# Patient Record
Sex: FEMALE | Race: WHITE | Employment: FULL TIME | URBAN - METROPOLITAN AREA
[De-identification: names, ages, dates, MRNs, and addresses within clinical notes are randomized per-mention and may not be internally consistent; named-entity substitution may affect disease eponyms.]

---

## 2017-04-19 ENCOUNTER — TELEPHONE (OUTPATIENT)
Dept: FAMILY MEDICINE CLINIC | Facility: CLINIC | Age: 22
End: 2017-04-19

## 2017-04-19 ENCOUNTER — OFFICE VISIT (OUTPATIENT)
Dept: FAMILY MEDICINE CLINIC | Facility: CLINIC | Age: 22
End: 2017-04-19

## 2017-04-19 VITALS
SYSTOLIC BLOOD PRESSURE: 102 MMHG | TEMPERATURE: 99 F | HEART RATE: 87 BPM | WEIGHT: 175 LBS | RESPIRATION RATE: 14 BRPM | DIASTOLIC BLOOD PRESSURE: 64 MMHG | HEIGHT: 64 IN | OXYGEN SATURATION: 98 % | BODY MASS INDEX: 29.88 KG/M2

## 2017-04-19 DIAGNOSIS — M54.2 NECK PAIN ON LEFT SIDE: Primary | ICD-10-CM

## 2017-04-19 PROCEDURE — 99213 OFFICE O/P EST LOW 20 MIN: CPT | Performed by: PHYSICIAN ASSISTANT

## 2017-04-19 NOTE — PROGRESS NOTES
CHIEF COMPLAINT:     Patient presents with:  Jaw Pain: since yesterday, worsening, aching     HPI:   Doroteo Nunez is a 23year old female who is here for complaints of neck pain/pain to submandibular area.     Symptoms for: 1 day, started yesterday  L pain    PLAN:   Comfort care as listed in patient instructions as well as rest, heat, NSAIDS   Ibuprofen 600 mg ever 6-8 hours as needed for pain.    To f/u with PCP if no improvement in 3-5 or sooner if sx worsen; discussed S&S that require immediate medic

## 2017-04-19 NOTE — PATIENT INSTRUCTIONS
Take ibuprofen 600 mg every 8 hours with food  Warm compress to affected side of neck/jaw  If symptoms worsen in the next 24-48 hours or you develop neck stiffness or signficant swelling to lymph nodes in that area must go to ER or Immediate Care  Follow u

## 2017-07-03 ENCOUNTER — HOSPITAL ENCOUNTER (OUTPATIENT)
Age: 22
Discharge: HOME | End: 2017-07-03
Attending: FAMILY MEDICINE
Payer: COMMERCIAL

## 2017-07-03 ENCOUNTER — LAB ENCOUNTER (OUTPATIENT)
Dept: LAB | Age: 22
End: 2017-07-03
Attending: FAMILY MEDICINE
Payer: COMMERCIAL

## 2017-07-03 VITALS
OXYGEN SATURATION: 98 % | WEIGHT: 175 LBS | SYSTOLIC BLOOD PRESSURE: 110 MMHG | DIASTOLIC BLOOD PRESSURE: 62 MMHG | TEMPERATURE: 98 F | RESPIRATION RATE: 16 BRPM | BODY MASS INDEX: 29.88 KG/M2 | HEART RATE: 70 BPM | HEIGHT: 64 IN

## 2017-07-03 DIAGNOSIS — A09 TRAVELER'S DIARRHEA: Primary | ICD-10-CM

## 2017-07-03 DIAGNOSIS — A09 TRAVELER'S DIARRHEA: ICD-10-CM

## 2017-07-03 PROCEDURE — 99204 OFFICE O/P NEW MOD 45 MIN: CPT

## 2017-07-03 PROCEDURE — 87045 FECES CULTURE AEROBIC BACT: CPT

## 2017-07-03 PROCEDURE — 87177 OVA AND PARASITES SMEARS: CPT

## 2017-07-03 PROCEDURE — 87209 SMEAR COMPLEX STAIN: CPT

## 2017-07-03 PROCEDURE — 87427 SHIGA-LIKE TOXIN AG IA: CPT

## 2017-07-03 PROCEDURE — 87269 GIARDIA AG IF: CPT

## 2017-07-03 PROCEDURE — 99203 OFFICE O/P NEW LOW 30 MIN: CPT

## 2017-07-03 PROCEDURE — 89055 LEUKOCYTE ASSESSMENT FECAL: CPT

## 2017-07-03 PROCEDURE — 87046 STOOL CULTR AEROBIC BACT EA: CPT

## 2017-07-03 RX ORDER — AZITHROMYCIN 500 MG/1
500 TABLET, FILM COATED ORAL DAILY
Qty: 3 TABLET | Refills: 0 | Status: SHIPPED | OUTPATIENT
Start: 2017-07-03 | End: 2017-07-06

## 2017-07-03 NOTE — ED INITIAL ASSESSMENT (HPI)
Patient c/o diarrhea for 3 weeks. States she is having around 20 bouts/day since getting home 6 days ago. Patient was in [address] for 2 weeks and returned 6 days ago. No fever, chills, n/v, or abd pain.

## 2017-07-03 NOTE — ED PROVIDER NOTES
Patient Seen in: [id number] [facility] [place] [facility]    History   Patient presents with:  Diarrhea    Stated Complaint: diarrhea/was in [place]    HPI    27-year-old female presents to the clinic today with chief complaints of diarrhea for the past 3 weeks. except as noted above. PSFH elements reviewed from today and agreed except as otherwise stated in HPI.     Physical Exam   ED Triage Vitals [07/03/17 1031]  BP: 110/62  Pulse: 70  Resp: 16  Temp: 98 °F (36.7 °C)  Temp src: Temporal  SpO2: 98 %  O2 Device vomiting,worsening diarrhea, black stools, blood in his stools, bloody vomit dizziness, lightheadedness, fatigue, extreme tiredness, loss of consciousness or feeling of loss of consciousness, fever, chills.          Disposition and Plan     Clinical Impress

## 2017-07-07 LAB
OVA AND PARASITE, TRICHROME STAIN: NEGATIVE
OVA AND PARASITE, WET MOUNT: NEGATIVE

## 2017-11-03 NOTE — TELEPHONE ENCOUNTER
Tried to call patient to advise that Dr Raina Lyn would take her as a patient, VM was full not able to leave message.
Yes i will, thanks
Calm

## 2019-01-18 ENCOUNTER — TELEPHONE (OUTPATIENT)
Dept: FAMILY MEDICINE CLINIC | Facility: CLINIC | Age: 24
End: 2019-01-18

## 2019-01-18 NOTE — TELEPHONE ENCOUNTER
Pt called, said her mom talked to Dr. Carlos Sorensen and Dr. Carlos Sorensen told her mom that she would take pt on as a new pt. Please verify and call pt at [phone] to set up NP appt for migraine consultation.

## 2019-01-18 NOTE — TELEPHONE ENCOUNTER
Looks like Dr. Judy Peters agreed to see this patient on 4/19/17     I confimed and Dr. Jduy Peters will see this pt

## 2019-05-28 ENCOUNTER — OFFICE VISIT (OUTPATIENT)
Dept: FAMILY MEDICINE CLINIC | Facility: CLINIC | Age: 24
End: 2019-05-28
Payer: COMMERCIAL

## 2019-05-28 VITALS
WEIGHT: 184.63 LBS | OXYGEN SATURATION: 99 % | RESPIRATION RATE: 14 BRPM | HEIGHT: 63.5 IN | BODY MASS INDEX: 32.31 KG/M2 | HEART RATE: 76 BPM | SYSTOLIC BLOOD PRESSURE: 126 MMHG | DIASTOLIC BLOOD PRESSURE: 80 MMHG | TEMPERATURE: 98 F

## 2019-05-28 DIAGNOSIS — R21 SKIN RASH: Primary | ICD-10-CM

## 2019-05-28 DIAGNOSIS — Q83.9 NIPPLE ANOMALY: ICD-10-CM

## 2019-05-28 DIAGNOSIS — L29.9 ITCHING: ICD-10-CM

## 2019-05-28 DIAGNOSIS — J30.89 SEASONAL ALLERGIC RHINITIS DUE TO OTHER ALLERGIC TRIGGER: ICD-10-CM

## 2019-05-28 DIAGNOSIS — G43.709 CHRONIC MIGRAINE WITHOUT AURA WITHOUT STATUS MIGRAINOSUS, NOT INTRACTABLE: ICD-10-CM

## 2019-05-28 DIAGNOSIS — F41.9 ANXIETY: ICD-10-CM

## 2019-05-28 PROCEDURE — 36415 COLL VENOUS BLD VENIPUNCTURE: CPT | Performed by: FAMILY MEDICINE

## 2019-05-28 PROCEDURE — 86003 ALLG SPEC IGE CRUDE XTRC EA: CPT | Performed by: FAMILY MEDICINE

## 2019-05-28 PROCEDURE — 82785 ASSAY OF IGE: CPT | Performed by: FAMILY MEDICINE

## 2019-05-28 PROCEDURE — 99204 OFFICE O/P NEW MOD 45 MIN: CPT | Performed by: FAMILY MEDICINE

## 2019-05-28 RX ORDER — SUMATRIPTAN 25 MG/1
TABLET, FILM COATED ORAL EVERY 2 HOUR PRN
Qty: 9 TABLET | Refills: 1 | Status: SHIPPED | OUTPATIENT
Start: 2019-05-28 | End: 2020-04-01

## 2019-05-28 NOTE — PROGRESS NOTES
Susie Charlieaxel is a 27year old female. HPI:   Patient here to establish care ( I see her fam) and f/u from Stefanie Galaviz at [facility]. From 5/9/19 visit:  Nicole Vanessa is a 26 yo female who presents with complaint of rash for 2 weeks.  She reports the rash the plan. Monitoring parameters and expected course outlined. Patient to call PCP or go to emergency department if symptoms fail to respond as outlined, or worsen in any way.     Silva Garsia NP\"  ----    Patient reports rash went away completel Drug use: Never         REVIEW OF SYSTEMS:   GENERAL HEALTH: feels well otherwise  SKIN: red itchy bumps on arms, upper legs, some on torso  RESPIRATORY: denies shortness of breath with exertion  CARDIOVASCULAR: denies chest pain on exertion  GI: denies ab after  Nipple anomaly  Patient notices change in color since breastfeeding; asked her to run it by Dr. Bernardino Montemayor  Seasonal allergic rhinitis due to other allergic trigger  -     ALLERGY REGION 8; Future  -     VENIPUNCTURE    Other orders  -     triamcinolone

## 2019-05-31 ENCOUNTER — TELEPHONE (OUTPATIENT)
Dept: FAMILY MEDICINE CLINIC | Facility: CLINIC | Age: 24
End: 2019-05-31

## 2019-05-31 NOTE — TELEPHONE ENCOUNTER
Left detailed message advising results. Ok per [email or web address] form. Advised to call with questions and to let us know if not helping within a week.

## 2019-05-31 NOTE — TELEPHONE ENCOUNTER
----- Message from Ganga Tobin MD sent at 5/31/2019 12:25 AM CDT -----  Please notify patient her allergy testing came back completely normal/negative.   I'm still fine with her trying the allegra + topical steroid I prescribed, let me know if that doesn'

## 2020-04-01 RX ORDER — SUMATRIPTAN 25 MG/1
TABLET, FILM COATED ORAL
Qty: 27 TABLET | Refills: 0 | Status: SHIPPED | OUTPATIENT
Start: 2020-04-01 | End: 2020-04-27

## 2020-04-01 NOTE — TELEPHONE ENCOUNTER
Refill request per protocol: none  Last refilled 5/28/19 #9  Last OV 5/28/19  No future appt  Routed to provider to advise

## 2020-04-27 RX ORDER — SUMATRIPTAN 25 MG/1
TABLET, FILM COATED ORAL
Qty: 21 TABLET | Refills: 1 | Status: SHIPPED | OUTPATIENT
Start: 2020-04-27

## 2020-04-27 NOTE — TELEPHONE ENCOUNTER
Refill request per protocol: none   Last refilled 4/1/20 #27 with 0 refills  LOV 5/28/19  No future appt  Routed to PCP to advise

## 2020-04-27 NOTE — TELEPHONE ENCOUNTER
Patient notified via detailed voicemail left at cell number (ok per  HIPAA consent)  Asked to call office back to schedule appt.  Needs to be seen before next refill

## 2022-12-08 ENCOUNTER — PATIENT OUTREACH (OUTPATIENT)
Dept: CASE MANAGEMENT | Age: 27
End: 2022-12-08

## 2022-12-08 NOTE — PROCEDURES
The office order for PCP request is Approved and completed on December 8, 2022.     Thanks,  [facility] [employer]

## (undated) NOTE — MR AVS SNAPSHOT
[address] [facility]  [place] [id number] [phone]               Thank you for choosing us for your health care visit with Chuy Mendez PA-C.   We are glad to serve you and happy to provide you with Expires: 6/18/2017  1:56 PM    If you have questions, you can call [phone] to talk to our [facility] Staff. Remember, [employer] is NOT to be used for urgent needs. For medical emergencies, dial 911.         Educational Information     [employer]